# Patient Record
Sex: MALE | Race: WHITE
[De-identification: names, ages, dates, MRNs, and addresses within clinical notes are randomized per-mention and may not be internally consistent; named-entity substitution may affect disease eponyms.]

---

## 2019-07-08 ENCOUNTER — HOSPITAL ENCOUNTER (EMERGENCY)
Dept: HOSPITAL 74 - JER | Age: 34
Discharge: HOME | End: 2019-07-08
Payer: COMMERCIAL

## 2019-07-08 VITALS — DIASTOLIC BLOOD PRESSURE: 62 MMHG | HEART RATE: 120 BPM | TEMPERATURE: 98.4 F | SYSTOLIC BLOOD PRESSURE: 149 MMHG

## 2019-07-08 VITALS — BODY MASS INDEX: 45.8 KG/M2
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
## 2019-07-08 DIAGNOSIS — T16.2XXA: Primary | ICD-10-CM
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
## 2019-07-08 PROCEDURE — 09C47ZZ EXTIRPATION OF MATTER FROM LEFT EXTERNAL AUDITORY CANAL, VIA NATURAL OR ARTIFICIAL OPENING: ICD-10-PCS | Performed by: STUDENT IN AN ORGANIZED HEALTH CARE EDUCATION/TRAINING PROGRAM

## 2019-07-08 NOTE — PDOC
History of Present Illness





- General


Chief Complaint: Ear Problem


Stated Complaint: EAR ACHE


Time Seen by Provider: 07/08/19 08:08


History Source: Patient


Exam Limitations: No Limitations





- History of Present Illness


Initial Comments: 





07/08/19 08:32


33-year-old male presents to ED with complaints of foreign body to his left 

ear. Patient states mother told him if he had an earache after swimming to 

place a piece of garlic in his ear which he did last night but forgot about it 

and now unable to remove it. Patient denies pain to the ear


07/08/19 08:33





Timing/Duration: 24 hours


Severity: mild


Associated Symptoms: reports: denies symptoms





Past History





- Travel


Traveled outside of the country in the last 30 days: No


Close contact w/someone who was outside of country & ill: No





- Past Medical History


Allergies/Adverse Reactions: 


 Allergies











Allergy/AdvReac Type Severity Reaction Status Date / Time


 


No Known Allergies Allergy   Verified 07/08/19 07:39











Home Medications: 


Ambulatory Orders





NK [No Known Home Medication]  10/15/16 








COPD: No


Psychiatric Problems: Yes (bipolar)





- Immunization History


Immunization Up to Date: Yes





- Suicide/Smoking/Psychosocial Hx


Smoking History: Never smoked


Number of Cigarettes Smoked Daily: 1


Information on smoking cessation initiated: No


Hx Alcohol Use: No


Drug/Substance Use Hx: No


Patient Lives Alone: No


Lives with/in: parents





**Review of Systems





- Review of Systems


Able to Perform ROS?: Yes


Constitutional: No: Symptoms Reported


HEENTM: Yes: Ear Pain


Respiratory: No: Symptoms reported


Integumentary: No: Symptoms Reported


Neurological: No: Headache, Dizziness





*Physical Exam





- Vital Signs


 Last Vital Signs











Temp Pulse Resp BP Pulse Ox


 


 98.4 F   120 H  18   149/62   97 


 


 07/08/19 07:36  07/08/19 07:36  07/08/19 07:36  07/08/19 07:36  07/08/19 07:36














- Physical Exam


General Appearance: Yes: Nourished, Appropriately Dressed.  No: Apparent 

Distress


HEENT: negative: TMs Normal (noted whitish semi-firm object occluding left TM)


Integumentary: positive: Normal Color, Warm, Moist


Neurologic: positive: Motor Strength 5/5 ( ambulatory)





Medical Decision Making





- Medical Decision Making





07/08/19 08:34


Chief complaint: Patient with foreign body to left ear canal patient placed 

colic in his ear last night secondary to ear pain after swimming 2 days ago.


Exam: Noted whitish semi-firm object occluding left TM and occupying space of 

ear canal


Plan: Utilizing alligator forceps was able to remove a clove of garlic without 

difficulty.


Left TM intact no ear canal irritation or erythema


Patient to be discharged home with supportive care instructions 





*DC/Admit/Observation/Transfer


Diagnosis at time of Disposition: 


 Foreign body in ear








- Discharge Dispostion


Disposition: HOME


Condition at time of disposition: Improved





- Referrals


Referrals: 


Cruz Drummond MD [Primary Care Provider] - 





- Patient Instructions


Printed Discharge Instructions:  DI for Removal of Foreign Body From Ear


Additional Instructions: 


Do not place anything in your ears.


Do not place any earbuds your ears for the next 2-3 days to allow area to 

completely heal and avoid irritation





- Post Discharge Activity

## 2021-04-15 ENCOUNTER — HOSPITAL ENCOUNTER (OUTPATIENT)
Dept: HOSPITAL 74 - JASU-SURG | Age: 36
Discharge: HOME | End: 2021-04-15
Attending: UROLOGY
Payer: COMMERCIAL

## 2021-04-15 VITALS — TEMPERATURE: 96.6 F | HEART RATE: 84 BPM

## 2021-04-15 VITALS — DIASTOLIC BLOOD PRESSURE: 72 MMHG | SYSTOLIC BLOOD PRESSURE: 115 MMHG

## 2021-04-15 VITALS — BODY MASS INDEX: 44.3 KG/M2

## 2021-04-15 DIAGNOSIS — T85.890A: Primary | ICD-10-CM

## 2021-04-15 DIAGNOSIS — E11.9: ICD-10-CM

## 2021-04-15 DIAGNOSIS — R35.0: ICD-10-CM

## 2021-04-15 PROCEDURE — 01PY0MZ REMOVAL OF NEUROSTIMULATOR LEAD FROM PERIPHERAL NERVE, OPEN APPROACH: ICD-10-PCS | Performed by: UROLOGY

## 2021-08-04 ENCOUNTER — HOSPITAL ENCOUNTER (EMERGENCY)
Dept: HOSPITAL 74 - JER | Age: 36
Discharge: HOME | End: 2021-08-04
Payer: COMMERCIAL

## 2021-08-04 VITALS — BODY MASS INDEX: 43.5 KG/M2

## 2021-08-04 VITALS — HEART RATE: 97 BPM | TEMPERATURE: 98.2 F | DIASTOLIC BLOOD PRESSURE: 81 MMHG | SYSTOLIC BLOOD PRESSURE: 119 MMHG

## 2021-08-04 DIAGNOSIS — R07.9: Primary | ICD-10-CM

## 2021-08-04 LAB
ALBUMIN SERPL-MCNC: 3.8 G/DL (ref 3.4–5)
ALP SERPL-CCNC: 81 U/L (ref 45–117)
ALT SERPL-CCNC: 58 U/L (ref 13–61)
ANION GAP SERPL CALC-SCNC: 10 MMOL/L (ref 8–16)
APPEARANCE UR: CLEAR
APTT BLD: 39.5 SECONDS (ref 25.2–36.5)
AST SERPL-CCNC: 40 U/L (ref 15–37)
BACTERIA # UR AUTO: 7 /UL (ref 0–1359)
BASOPHILS # BLD: 1 % (ref 0–2)
BILIRUB SERPL-MCNC: 0.6 MG/DL (ref 0.2–1)
BILIRUB UR STRIP.AUTO-MCNC: (no result) MG/DL
BUN SERPL-MCNC: 8.3 MG/DL (ref 7–18)
CALCIUM SERPL-MCNC: 8.8 MG/DL (ref 8.5–10.1)
CASTS URNS QL MICRO: 6 /UL (ref 0–3.1)
CHLORIDE SERPL-SCNC: 109 MMOL/L (ref 98–107)
CO2 SERPL-SCNC: 22 MMOL/L (ref 21–32)
COLOR UR: (no result)
CREAT SERPL-MCNC: 0.9 MG/DL (ref 0.55–1.3)
DEPRECATED RDW RBC AUTO: 15.4 % (ref 11.9–15.9)
EOSINOPHIL # BLD: 0.9 % (ref 0–4.5)
EPITH CASTS URNS QL MICRO: 11 /UL (ref 0–25.1)
GLUCOSE SERPL-MCNC: 82 MG/DL (ref 74–106)
HCT VFR BLD CALC: 40.2 % (ref 35.4–49)
HGB BLD-MCNC: 13.7 GM/DL (ref 11.7–16.9)
INR BLD: 0.97 (ref 0.83–1.09)
KETONES UR QL STRIP: (no result)
LEUKOCYTE ESTERASE UR QL STRIP.AUTO: (no result)
LYMPHOCYTES # BLD: 39.3 % (ref 8–40)
MAGNESIUM SERPL-MCNC: 2.1 MG/DL (ref 1.8–2.4)
MCH RBC QN AUTO: 29.8 PG (ref 25.7–33.7)
MCHC RBC AUTO-ENTMCNC: 34.1 G/DL (ref 32–35.9)
MCV RBC: 87.5 FL (ref 80–96)
MONOCYTES # BLD AUTO: 8.9 % (ref 3.8–10.2)
NEUTROPHILS # BLD: 49.9 % (ref 42.8–82.8)
NITRITE UR QL STRIP: NEGATIVE
PH UR: 5.5 [PH] (ref 5–8)
PLATELET # BLD AUTO: 158 10^3/UL (ref 134–434)
PMV BLD: 9.9 FL (ref 7.5–11.1)
PROT SERPL-MCNC: 7.5 G/DL (ref 6.4–8.2)
PROT UR QL STRIP: (no result)
PROT UR QL STRIP: NEGATIVE
PT PNL PPP: 11.7 SEC (ref 9.7–13)
RBC # BLD AUTO: 18 /UL (ref 0–23.9)
RBC # BLD AUTO: 4.6 M/MM3 (ref 4–5.6)
SODIUM SERPL-SCNC: 141 MMOL/L (ref 136–145)
SP GR UR: 1.04 (ref 1.01–1.03)
UROBILINOGEN UR STRIP-MCNC: 1 MG/DL (ref 0.2–1)
WBC # BLD AUTO: 7 K/MM3 (ref 4–10)
WBC # UR AUTO: 23 /UL (ref 0–25.8)

## 2021-08-04 PROCEDURE — C9803 HOPD COVID-19 SPEC COLLECT: HCPCS

## 2021-08-04 PROCEDURE — U0003 INFECTIOUS AGENT DETECTION BY NUCLEIC ACID (DNA OR RNA); SEVERE ACUTE RESPIRATORY SYNDROME CORONAVIRUS 2 (SARS-COV-2) (CORONAVIRUS DISEASE [COVID-19]), AMPLIFIED PROBE TECHNIQUE, MAKING USE OF HIGH THROUGHPUT TECHNOLOGIES AS DESCRIBED BY CMS-2020-01-R: HCPCS

## 2021-08-04 PROCEDURE — U0005 INFEC AGEN DETEC AMPLI PROBE: HCPCS

## 2022-06-18 ENCOUNTER — HOSPITAL ENCOUNTER (EMERGENCY)
Dept: HOSPITAL 74 - JER | Age: 37
Discharge: HOME | End: 2022-06-18
Payer: COMMERCIAL

## 2022-06-18 VITALS — BODY MASS INDEX: 39.9 KG/M2

## 2022-06-18 VITALS — HEART RATE: 135 BPM | SYSTOLIC BLOOD PRESSURE: 112 MMHG | DIASTOLIC BLOOD PRESSURE: 75 MMHG | TEMPERATURE: 99 F

## 2022-06-18 DIAGNOSIS — N30.00: Primary | ICD-10-CM

## 2022-06-18 LAB
APPEARANCE UR: (no result)
BACTERIA # UR AUTO: 24 /UL (ref 0–1359)
BILIRUB UR STRIP.AUTO-MCNC: NEGATIVE MG/DL
CASTS URNS QL MICRO: 5 /UL (ref 0–3.1)
COLOR UR: (no result)
EPITH CASTS URNS QL MICRO: 1 /UL (ref 0–25.1)
KETONES UR QL STRIP: NEGATIVE
LEUKOCYTE ESTERASE UR QL STRIP.AUTO: (no result)
NITRITE UR QL STRIP: NEGATIVE
PH UR: 7.5 [PH] (ref 5–8)
PROT UR QL STRIP: (no result)
PROT UR QL STRIP: NEGATIVE
RBC # BLD AUTO: 8439 /UL (ref 0–23.9)
SP GR UR: 1.01 (ref 1.01–1.03)
UROBILINOGEN UR STRIP-MCNC: 1 MG/DL (ref 0.2–1)
WBC # UR AUTO: 565 /UL (ref 0–25.8)